# Patient Record
Sex: FEMALE
[De-identification: names, ages, dates, MRNs, and addresses within clinical notes are randomized per-mention and may not be internally consistent; named-entity substitution may affect disease eponyms.]

---

## 2021-04-19 PROBLEM — Z00.00 ENCOUNTER FOR PREVENTIVE HEALTH EXAMINATION: Status: ACTIVE | Noted: 2021-04-19

## 2021-04-27 ENCOUNTER — APPOINTMENT (OUTPATIENT)
Dept: ENDOCRINOLOGY | Facility: CLINIC | Age: 51
End: 2021-04-27
Payer: COMMERCIAL

## 2021-04-27 VITALS — BODY MASS INDEX: 19.61 KG/M2 | WEIGHT: 122 LBS | HEIGHT: 66 IN

## 2021-04-27 DIAGNOSIS — E05.00 THYROTOXICOSIS WITH DIFFUSE GOITER W/OUT THYROTOXIC CRISIS OR STORM: ICD-10-CM

## 2021-04-27 PROCEDURE — 99203 OFFICE O/P NEW LOW 30 MIN: CPT | Mod: 95

## 2021-04-27 RX ORDER — ERYTHROMYCIN 5 MG/G
5 OINTMENT OPHTHALMIC
Qty: 3 | Refills: 0 | Status: ACTIVE | COMMUNITY
Start: 2019-11-21

## 2021-04-27 RX ORDER — CONJUGATED ESTROGENS 0.62 MG/G
0.62 CREAM VAGINAL
Qty: 30 | Refills: 0 | Status: ACTIVE | COMMUNITY
Start: 2020-12-17

## 2021-04-27 NOTE — HISTORY OF PRESENT ILLNESS
[FreeTextEntry1] : Diagnosed with Graves disease after presenting with weight loss, palpitations, GALLO, heat intolerance.\par symptoms started in January, and then was started on methimazole 10mg bid but developed rash on legs.\par She saw endocrine in [place] who recommended stopping methimazole due to low wbc and rash  and doing TURCIOS, but she prefers not to do TURCIOS, says she isn't ready for that.\par Repeat labs showed normal CBC, normalized LFTs and rash improved after reducing methimazole dose from 10mg bid to 10mg/day.\par She stopped propranolol; heart rate now in the 70s but pre-Graves, heart rate was lower.\par now feels pretty good, no palpitations, energy is good.  no heat intolerance.   gained weight, now is back to her baseline.\par lowest weight was 118,  current weight is 122 lb\par MOther has thyroid nodule,  1 sister had Graves (treated with TURCIOS after a recurrence) and 1 sister has hypothyroidism.\par labs from 4/21 reviewed:  TSH still suppressed, T4 and T3 normal.  \par scheduled to get Pfizer vaccine\par \par PMH: Sjogren's diagnosed 11 years ago.  takes Restasis and artificial tears\par Graves\par \par Meds:\par methimazole 10mg/day\par Premarin vag cream prn\par erythromycin eye drops hs prn\par Centrum Mvi, vitamin D, calcium, fish oil\par

## 2021-04-27 NOTE — REASON FOR VISIT
[Other Location: e.g. School (Enter Location, City,State)___] : at [unfilled], at the time of the visit. [Medical Office: ([facility])___] : at the medical office located in  [Verbal consent obtained from patient] : the patient, [unfilled] [Initial Evaluation] : an initial evaluation [Hyperthyroidism] : hyperthyroidism

## 2021-04-27 NOTE — ASSESSMENT
[FreeTextEntry1] : Graves disease.\par TSH typically is the last part of the thyroid panel to return to normal range.  Once TSH is normal, then will try reducing methimazole dose further, but she should anticipate being on medication for 1-2 years.\par I am agreeable to continuing methimazole long term (more than 2 years) as long as TFTs are maintained in normal range with low dose (10mg/day or less) and TFTs are not fluctuating (and assuming patient compliance with medication).  If TFTs cannot be controlled with methimazole, then will refer for radioactive iodine. \par will resend for thyroid sono later this year, to get more detailed report\par OK to resume exercise \par

## 2021-04-27 NOTE — DATA REVIEWED
[FreeTextEntry1] : 4/21: TSH  < 0.01, free T4 1.4, T3 121\par 3/21 TSH 0.01, free T4 1.5, T3 117\par 2/21: free T4 6.3, T3 461, TPO 2, Tg Ab < 1, TSH Rec Ab 14.11

## 2021-07-15 ENCOUNTER — APPOINTMENT (OUTPATIENT)
Dept: ENDOCRINOLOGY | Facility: CLINIC | Age: 51
End: 2021-07-15
Payer: COMMERCIAL

## 2021-07-15 VITALS
WEIGHT: 125 LBS | BODY MASS INDEX: 20.09 KG/M2 | DIASTOLIC BLOOD PRESSURE: 63 MMHG | HEIGHT: 66 IN | HEART RATE: 84 BPM | SYSTOLIC BLOOD PRESSURE: 107 MMHG

## 2021-07-15 PROCEDURE — 99072 ADDL SUPL MATRL&STAF TM PHE: CPT

## 2021-07-15 PROCEDURE — 99213 OFFICE O/P EST LOW 20 MIN: CPT

## 2021-07-15 NOTE — PHYSICAL EXAM
[Alert] : alert [No Acute Distress] : no acute distress [No Proptosis] : no proptosis [No Lid Lag] : no lid lag [Normal Hearing] : hearing was normal [No LAD] : no lymphadenopathy [Clear to Auscultation] : lungs were clear to auscultation bilaterally [Normal S1, S2] : normal S1 and S2 [Regular Rhythm] : with a regular rhythm [Normal Affect] : the affect was normal [Normal Mood] : the mood was normal [Acanthosis Nigricans] : no acanthosis nigricans [de-identified] : thyroid palpable

## 2021-07-15 NOTE — ASSESSMENT
[FreeTextEntry1] : Graves disease.\par Trending hypothyroid now.  reduce methimazole to 5mg/day\par [facility] form given to recheck TFTs in 6-8 weeks\par will eventually try to discontinue methimazole, but explained that recurrence is still possible, even if she has remission. Recurrence of hyperthyroidism also is less well tolerated in older ages, so definitive therapy (with TURCIOS) versus long term methimazole can be discussed, if needed.\par will recheck thyroid sono (previous report lacking detail)\par RTO 4 months

## 2021-07-15 NOTE — HISTORY OF PRESENT ILLNESS
[FreeTextEntry1] : Was feeling less hot but then hot flashes returned.  LMP 2 years ago\par having difficulty losing weight\par no constipation.  not sleepy.\par labs from 7/21 reviewed:  TSH 7.4\par \par PMH: Sjogren's diagnosed 11 years ago.  takes Restasis and artificial tears\par Graves\par \par Meds:\par methimazole 10mg/day\par Premarin vag cream prn\par erythromycin eye drops hs prn\par Centrum Mvi, vitamin D, calcium, fish oil\par

## 2021-07-15 NOTE — DATA REVIEWED
[FreeTextEntry1] : 7/21: TSH 7.4, T3 71, free T4 0.8\par 4/21: TSH  < 0.01, free T4 1.4, T3 121\par 3/21 TSH 0.01, free T4 1.5, T3 117\par 2/21: free T4 6.3, T3 461, TPO 2, Tg Ab < 1, TSH Rec Ab 14.11

## 2021-11-11 ENCOUNTER — APPOINTMENT (OUTPATIENT)
Dept: ENDOCRINOLOGY | Facility: CLINIC | Age: 51
End: 2021-11-11
Payer: COMMERCIAL

## 2021-11-11 DIAGNOSIS — M81.0 AGE-RELATED OSTEOPOROSIS W/OUT CURRENT PATHOLOGICAL FRACTURE: ICD-10-CM

## 2021-11-11 PROCEDURE — 99214 OFFICE O/P EST MOD 30 MIN: CPT | Mod: 95

## 2021-11-11 NOTE — HISTORY OF PRESENT ILLNESS
[Other Location: e.g. School (Enter Location, City,State)___] : at [unfilled], at the time of the visit. [Medical Office: ([facility])___] : at the medical office located in  [Verbal consent obtained from patient] : the patient, [unfilled] [FreeTextEntry1] : Feeling pretty good.\par Bone density results discussed:  T score very low in spine, T -3.5.   \par no history of fractures.  no FH of hip fracture but both parents have osteoporosis \par menopause 2 years ago\par Does Pilates and runs.\par options for osteoporosis medications emailed to pt ahead of appointment \par \par labs from 11/21 reviewed:  TSH 2.13  normal vitamin D, calcium, PTH.  celiac Ab pending\par \par PMH: Sjogren's diagnosed 11 years ago.  takes Restasis and artificial tears\par Graves\par \par Meds:\par methimazole 5mg/day\par Premarin vag cream prn\par erythromycin eye drops hs prn\par Centrum Mvi, vitamin D, calcium, fish oil\par

## 2021-11-11 NOTE — DATA REVIEWED
[FreeTextEntry1] : 11/21: TSH 2.13, T3 68, Ca 9.6, PTH 51, 25D 51, BSAP 33.4\par 9/21: TSH 1.34, T3 98, TSH Rec Ab 5.79\par 7/21: TSH 7.4, T3 71, free T4 0.8\par 4/21: TSH  < 0.01, free T4 1.4, T3 121\par 3/21 TSH 0.01, free T4 1.5, T3 117\par 2/21: free T4 6.3, T3 461, TPO 2, Tg Ab < 1, TSH Rec Ab 14.11\par \par thyroid sono 9/21:  diffuse heterogeneity\par bilateral nodules, 3-6mm.\par \par bone density, 4/21\par L1-L4  T -3.5  (L4  T -4.0), -13.7%  from 2018\par tot hip, T -0.8,  -5.9%  from 2017\par fem neck   T -1.5

## 2021-11-11 NOTE — ASSESSMENT
[FreeTextEntry1] : Graves disease.\par Euthyroid, continue current methimazole dose.  will reduce dose again when TSH > 3\par \par Osteoporosis.  Risk factors include family history, rheum condition (Sjogren's), low body weight.\par Discussed medication options.  Given her very low bone density, I recommend anabolic therapy, or Prolia.  She is agreeable to daily teriparatide.   will check calcium level in 4-6 weeks at [facility].\par Discussed calcium supplementation with patient.  Total recommended dietary calcium intake is 1200mg  but she only has to supplement what she doesn't get in her diet. I recommend getting half of calcium requirement from diet (through vegetable, fish, dairy, aim for 3 servings/day) and half from supplementation (500-600mg calcium carbonate, take with food).  Continue vitamin D supplemenation (D level is good).\par Weight bearing, balance and strengthening exercises recommended to reduce fall risk.

## 2021-11-15 ENCOUNTER — NON-APPOINTMENT (OUTPATIENT)
Age: 51
End: 2021-11-15

## 2021-11-16 ENCOUNTER — NON-APPOINTMENT (OUTPATIENT)
Age: 51
End: 2021-11-16

## 2021-11-16 RX ORDER — TERIPARATIDE 250 UG/ML
620 INJECTION, SOLUTION SUBCUTANEOUS
Qty: 3 | Refills: 1 | Status: DISCONTINUED | COMMUNITY
Start: 2021-11-11 | End: 2021-11-16

## 2022-07-28 ENCOUNTER — NON-APPOINTMENT (OUTPATIENT)
Age: 52
End: 2022-07-28

## 2022-08-01 ENCOUNTER — RX RENEWAL (OUTPATIENT)
Age: 52
End: 2022-08-01

## 2022-12-19 ENCOUNTER — RX RENEWAL (OUTPATIENT)
Age: 52
End: 2022-12-19

## 2023-03-01 ENCOUNTER — TRANSCRIPTION ENCOUNTER (OUTPATIENT)
Age: 53
End: 2023-03-01

## 2023-03-01 ENCOUNTER — APPOINTMENT (OUTPATIENT)
Dept: ENDOCRINOLOGY | Facility: CLINIC | Age: 53
End: 2023-03-01
Payer: COMMERCIAL

## 2023-03-01 PROCEDURE — 99214 OFFICE O/P EST MOD 30 MIN: CPT | Mod: 95

## 2023-03-01 RX ORDER — METHIMAZOLE 5 MG/1
5 TABLET ORAL
Qty: 90 | Refills: 1 | Status: ACTIVE | COMMUNITY
Start: 2021-02-23 | End: 1900-01-01

## 2023-03-01 NOTE — REASON FOR VISIT
[Other Location: e.g. School (Enter Location, City,State)___] : at [unfilled], at the time of the visit. [Medical Office: ([facility])___] : at the medical office located in  [Follow - Up] : a follow-up visit [Hyperthyroidism] : hyperthyroidism [Osteoporosis] : osteoporosis

## 2023-03-01 NOTE — HISTORY OF PRESENT ILLNESS
[FreeTextEntry1] : no health issues since last visit\par Taking half tab/day of methimazole and feels normal.\par Maybe not as energetic but she also has not been able to run recently due to an ankle injury\par some hot flashes coming back, but mild\par labs from 12/22 reviewed:  normal TSH\par \par PMH: Sjogren's diagnosed 11 years ago.  takes Restasis and artificial tears\par Graves\par osteoporosis. Tymlos started 11/21\par \par Meds:\par methimazole 5mg, half tab/day\par Tymlos\par Premarin vag cream prn\par erythromycin eye drops hs prn\par Centrum Mvi, vitamin D, calcium, fish oil\par

## 2023-03-01 NOTE — DATA REVIEWED
[FreeTextEntry1] : 12/22  TSH 1.27\par 7/22  TSH 5.34, T3 90\par 2/22  TSH 0.92, T3 92\par 11/21: TSH 2.13, T3 68, Ca 9.6, PTH 51, 25D 51, BSAP 33.4\par 9/21: TSH 1.34, T3 98, TSH Rec Ab 5.79\par 7/21: TSH 7.4, T3 71, free T4 0.8\par 4/21: TSH  < 0.01, free T4 1.4, T3 121\par 3/21 TSH 0.01, free T4 1.5, T3 117\par 2/21: free T4 6.3, T3 461, TPO 2, Tg Ab < 1, TSH Rec Ab 14.11\par \par thyroid sono 9/21:  diffuse heterogeneity\par bilateral nodules, 3-6mm.\par \par bone density, 4/21\par L1-L4  T -3.5  (L4  T -4.0), -13.7%  from 2018\par tot hip, T -0.8,  -5.9%  from 2017\par fem neck   T -1.5

## 2023-03-01 NOTE — ASSESSMENT
[FreeTextEntry1] : Graves disease on low dose methimazole\par will check TSH at [facility] and adjust methimazole dose, if necessary\par If TSH is normal, will try reducing and maybe discontinuing later this year.\par \par Osteoporosis on Tymlos.\par will check bone density in April and then decide whether to continue Tymlos for full 2 years  or transition to antiresorptive.  Explained need to transition to antiresorptive to maintain bone density gains.\par RTO 6 months

## 2023-05-15 ENCOUNTER — RX RENEWAL (OUTPATIENT)
Age: 53
End: 2023-05-15

## 2023-07-05 ENCOUNTER — NON-APPOINTMENT (OUTPATIENT)
Age: 53
End: 2023-07-05

## 2023-09-28 ENCOUNTER — NON-APPOINTMENT (OUTPATIENT)
Age: 53
End: 2023-09-28

## 2023-11-07 ENCOUNTER — TRANSCRIPTION ENCOUNTER (OUTPATIENT)
Age: 53
End: 2023-11-07

## 2023-11-09 ENCOUNTER — RX RENEWAL (OUTPATIENT)
Age: 53
End: 2023-11-09

## 2023-11-09 ENCOUNTER — TRANSCRIPTION ENCOUNTER (OUTPATIENT)
Age: 53
End: 2023-11-09

## 2023-11-09 RX ORDER — PEN NEEDLE, DIABETIC 29 G X1/2"
31G X 5 MM NEEDLE, DISPOSABLE MISCELLANEOUS
Qty: 100 | Refills: 0 | Status: ACTIVE | COMMUNITY
Start: 2021-11-11 | End: 1900-01-01

## 2023-11-09 RX ORDER — ABALOPARATIDE 2000 UG/ML
3120 INJECTION, SOLUTION SUBCUTANEOUS
Qty: 3 | Refills: 0 | Status: DISCONTINUED | COMMUNITY
Start: 2021-11-16 | End: 2023-11-09

## 2024-05-09 ENCOUNTER — RX RENEWAL (OUTPATIENT)
Age: 54
End: 2024-05-09

## 2024-05-09 RX ORDER — ALENDRONATE SODIUM 70 MG/1
70 TABLET ORAL
Qty: 12 | Refills: 1 | Status: ACTIVE | COMMUNITY
Start: 2023-11-09 | End: 1900-01-01

## 2024-06-21 LAB
ALBUMIN SERPL ELPH-MCNC: 4.9 G/DL
ALP BLD-CCNC: 63 U/L
ALT SERPL-CCNC: 26 U/L
ANION GAP SERPL CALC-SCNC: 13 MMOL/L
AST SERPL-CCNC: 24 U/L
BILIRUB SERPL-MCNC: 1 MG/DL
BUN SERPL-MCNC: 14 MG/DL
CALCIUM SERPL-MCNC: 10.1 MG/DL
CALCIUM SERPL-MCNC: 10.1 MG/DL
CHLORIDE SERPL-SCNC: 104 MMOL/L
CO2 SERPL-SCNC: 26 MMOL/L
CREAT SERPL-MCNC: 0.88 MG/DL
EGFR: 78 ML/MIN/1.73M2
GLUCOSE SERPL-MCNC: 87 MG/DL
PARATHYROID HORMONE INTACT: 45 PG/ML
POTASSIUM SERPL-SCNC: 4.8 MMOL/L
PROT SERPL-MCNC: 7.6 G/DL
SODIUM SERPL-SCNC: 143 MMOL/L
TSH SERPL-ACNC: 2.03 UIU/ML

## 2024-06-25 LAB — ALP BONE SERPL-MCNC: 10.5 UG/L

## 2024-07-08 ENCOUNTER — APPOINTMENT (OUTPATIENT)
Dept: ENDOCRINOLOGY | Facility: CLINIC | Age: 54
End: 2024-07-08
Payer: COMMERCIAL

## 2024-07-08 PROCEDURE — 99214 OFFICE O/P EST MOD 30 MIN: CPT

## 2025-06-05 ENCOUNTER — APPOINTMENT (OUTPATIENT)
Dept: ENDOCRINOLOGY | Facility: CLINIC | Age: 55
End: 2025-06-05

## 2025-06-05 DIAGNOSIS — E05.00 THYROTOXICOSIS WITH DIFFUSE GOITER W/OUT THYROTOXIC CRISIS OR STORM: ICD-10-CM

## 2025-06-05 DIAGNOSIS — M81.0 AGE-RELATED OSTEOPOROSIS W/OUT CURRENT PATHOLOGICAL FRACTURE: ICD-10-CM

## 2025-06-05 PROCEDURE — 99214 OFFICE O/P EST MOD 30 MIN: CPT | Mod: 95
